# Patient Record
Sex: FEMALE | Race: WHITE
[De-identification: names, ages, dates, MRNs, and addresses within clinical notes are randomized per-mention and may not be internally consistent; named-entity substitution may affect disease eponyms.]

---

## 2020-01-03 ENCOUNTER — HOSPITAL ENCOUNTER (OUTPATIENT)
Dept: HOSPITAL 44 - RT | Age: 65
End: 2020-01-03
Attending: NURSE PRACTITIONER
Payer: MEDICARE

## 2020-01-03 DIAGNOSIS — I10: Primary | ICD-10-CM

## 2020-01-03 DIAGNOSIS — E03.9: ICD-10-CM

## 2020-01-03 DIAGNOSIS — R07.89: ICD-10-CM

## 2020-01-03 LAB
EGFR (NON-AFRICAN): > 60
HDL: 36 MG/DL (ref 40–?)
TSH: 2.25 MIU/L (ref 0.47–4.68)

## 2020-01-03 PROCEDURE — 84484 ASSAY OF TROPONIN QUANT: CPT

## 2020-01-03 PROCEDURE — 84443 ASSAY THYROID STIM HORMONE: CPT

## 2020-01-03 PROCEDURE — 36415 COLL VENOUS BLD VENIPUNCTURE: CPT

## 2020-01-03 PROCEDURE — 80053 COMPREHEN METABOLIC PANEL: CPT

## 2020-01-03 PROCEDURE — 80061 LIPID PANEL: CPT

## 2020-01-03 NOTE — DIAGNOSTIC IMAGING REPORT
PATIENT MR#:   [id number]

PATIENT ACCT#: [id number]

PATIENT NAME:  BRANDY MOTTA 

YOB: 1955

REFERRING PHYSICIAN: Cassidy Berg

EXAM DATE:        1/3/2020

ACCESSION NUMBER: [id number]

EXAM DESCRIPTION: CHEST 2VIEW



Examination: PA and lateral chest.



History: Evaluate lung fields.



Comparison exam: None provided. 



Findings: PA and lateral views of the chest demonstrates a normal cardiac and mediastinal silhouette.
 No focal

infiltrate.  No blunting of the costophrenic margins.  Osseous structures are appropriate for age.



Impression: No acute pulmonary process.



Electronically signed by: Michael Hill on 01/03/2020 09:16:42









Read by:        Dr. Michael Hill

Transcribed by:   

Transcribed Date: 

Electronically signed by: Dr. Michael Hill

Date signed: 1/3/2020 9:21:29 AM

## 2020-01-15 ENCOUNTER — HOSPITAL ENCOUNTER (OUTPATIENT)
Dept: HOSPITAL 44 - RAD | Age: 65
End: 2020-01-15
Attending: FAMILY MEDICINE
Payer: MEDICARE

## 2020-01-15 DIAGNOSIS — R13.11: Primary | ICD-10-CM

## 2020-01-15 PROCEDURE — 74230 X-RAY XM SWLNG FUNCJ C+: CPT
